# Patient Record
(demographics unavailable — no encounter records)

---

## 2017-01-09 NOTE — PREOP
DATE OF ADMISSION:   01/18/2017

 

DATE OF DICTATION:  11/22/2016

 

HISTORY OF PRESENT ILLNESS:  The patient is a 65-year-old man admitted through the

[facility] for reduction and repair of chronically

incarcerated ventral hernia.  According to the patient, he has had the hernia for

approximately 2 years' time.  Recently it has become slightly larger in size and more

symptomatic.  

 

No underlying GI, , or respiratory complaints to suggest predisposition to hernia

formation. 

 

PAST MEDICAL HISTORY:  Significant only for hypertension.  No history of heart

disease, diabetes, respiratory, renal or hepatic insufficiency.  

 

PAST SURGICAL HISTORY:  Nil. 

 

ALLERGIES: None known. 

 

REGULAR MEDICATIONS:  Amlodipine. 

 

SOCIAL HISTORY:  Negative tobacco, negative alcohol. 

 

FAMILY HISTORY:  Nil. 

 

REVIEW OF SYSTEMS:  Nil. 

 

PHYSICAL EXAMINATION: 

Abdomen:  Soft, protuberant, nontender.  In the supraumbilical midline, there is a

bulge which represents a chronically incarcerated ventral hernia.  The umbilical ring

appears intact. 

 

IMPRESSION:  Chronically incarcerated ventral hernia. 

 

PLAN:  Reduction and repair of chronically incarcerated ventral hernia with mesh. 

 

Indications, terms, possible complications were reviewed.  Consent obtained. 

 

The patient will be seen preoperatively by Dr. Tsering Shine at [address] [place] [address].  Please refer to his notes for those medical details. 

 

 

ASHLEY HODGE/[id number]

DD: 11/22/2016 13:32

DT: 11/22/2016 17:19

Job #:  [id number]

CC:  Tsering Shine MD

## 2017-01-18 NOTE — OP
DATE OF OPERATION:  01/18/2017

 

PREOPERATIVE DIAGNOSIS:  Incarcerated ventral hernia.

 

POSTOPERATIVE DIAGNOSIS:  Incarcerated ventral hernia.

 

PROCEDURE:  Open reduction and repair incarcerated ventral hernia with

mesh/intermediate (8 cm).  

 

OPERATING SURGEON:  Geo Hale M.D.

 

FIRST ASSISTANT:  Bhaskar Loredo DO

 

ANESTHESIA: Paul Mora MD (general)

 

HISTORY:  A 65-year-old man admitted to the hospital for reduction and repair 
of a

chronically incarcerated ventral hernia, which is symptomatic.  

 

INDICATIONS:  Alternatives and possible complications reviewed.  Consent was

obtained.  

 

PROCEDURE:  With the patient in the supine position, and after general 
anesthesia,

the abdomen was prepped and draped in sterile fashion using chlorhexidine. 

 

A 5 cm transverse incision was made directly over the hernia and deepened into 
the

subcutaneous space.  The hernia sac was easily encountered and cleaned to the 
level of

the fascial ring.  

 

The fascial ring was incised circumferentially.  The hernia sac with its 
fibrofatty

contents were reduced.  

 

The retrorectus/preperitoneal space was then developed using sharp dissection,

creating enough room for placement of an underlying mesh.  Ultimately, a Bard

Ventralux hernia patch measuring 8 cm in diameter was selected for the repair.  

 

The patch was placed in the retrorectus/preperitoneal space and pulled up 
against the

undersurface of the anterior abdominal wall musculature using its straps.  The 
mesh

was tacked on with 0 Prolene through and through sutures, fashioning of the 
mesh in

each quadrant to the overlying rectus muscle.  

 

After adequate hemostasis and irrigation, the anterior fascia was closed in a

transverse fashion using interrupted No. 1 Vicryl sutures, leaving the mesh 
entirely

in the retrorectus space.  

 

Subcutaneous tissues were irrigated.  Adequate hemostasis ensured.  The wound 
was

closed in layers.  The subcutaneous tissue was approximated using interrupted 3-
0 Chromic.

4-0 Biosyn sutures interrupted subcuticular sutures placed.  The skin was 
closed using 

4-0 Biosyn subcuticular continuous fashion.  

 

Dermabond applied.  Procedure terminated.  

 

Needle and sponge count correct.  

 

ESTIMATED BLOOD LOSS:  Minimal.  

 

SPECIMEN:  None.

 

DRAIN:  None.  

 

IMPLANT:  Bard Ventralux ST hernia patch (8 cm diameter).  

 

 

ASHLEY HODGE/[id number]

DD: 01/18/2017 11:23

DT: 01/18/2017 14:13

Job #:  [id number]

MTDD

## 2017-01-22 NOTE — PDOC
History of Present Illness





- General


Chief Complaint: Pain, Acute


Stated Complaint: PAIN WITH URINATION POST HERNIA SURGERY


Time Seen by Provider: 01/22/17 23:16





- History of Present Illness


Initial Comments: 





01/23/17 05:39


4 days post op, complains of dysuria. Small urine volumes. No BM x 1 day. Vomit 

x 1. Has not been able to take his anti-htn. On opioids for pain. No fever. 

Mild abd pain. Feet are swollen





pmh: htn


fhx: non-contrib


ros: reviewed and otherwise negative








NAD


no diaphoresis


nonicteric


mmm


nl JVP


rrr


cta


abd distended, tympamic over upper abd, dull over lower abd, well healing 

periumbilical surgical scar, nontender


no CVAT


no groin lesions


no edema


nl speech, nl gait








POCUS--large distended bladder





vázquez placed by me, sterile technique, with 2L urine output





plain films--full of stool, ileus versus partial sbo, as read by me, referred 

to radiology for definitive review





fluid challenge in ED--tolerated PO without difficulty








a/p


post-op urinary retention. vázquez, leg bag, maintain adequate oral hydration





ileus/ constipation--likely secondary to opioids. laxatives, adequate po 

hydration. return to ED for another episode of vomiting








Past History





- Past Medical History


Allergies/Adverse Reactions: 


 Allergies











Allergy/AdvReac Type Severity Reaction Status Date / Time


 


No Known Allergies Allergy   Verified 01/22/17 22:46











Home Medications: 


Ambulatory Orders





Amlodipine Besylate 5 mg PO HS 01/11/17 


Atorvastatin Ca [Lipitor] 20 mg PO HS 01/11/17 


Oxycodone HCl/Acetaminophen [Percocet 5-325 mg Tablet] 1 tab PO Q4H PRN #42 

tablet MDD 6 01/18/17 








Anemia: No


Asthma: No


Cancer: No


Cardiac Disorders: No


CVA: No


COPD: No


CHF: No


Dementia: No


Diabetes: No


GI Disorders: No


 Disorders: No


HTN: Yes


Hypercholesterolemia: Yes


Liver Disease: No


Seizures: No


Thyroid Disease: No





- Surgical History


Abdominal Surgery: No


Appendectomy: No


Cardiac Surgery: No


Cholecystectomy: No


Lung Surgery: No


Neurologic Surgery: No


Orthopedic Surgery: No





- Psycho/Social/Smoking Cessation Hx


Anxiety: No


Suicidal Ideation: No


Smoking History: Never smoked


Have you smoked in the past 12 months: No


Information on smoking cessation initiated: No


Hx Alcohol Use: No


Drug/Substance Use Hx: No


Substance Use Type: None


Hx Substance Use Treatment: No





*Physical Exam





- Vital Signs


 Last Vital Signs











Temp Pulse Resp BP Pulse Ox


 


 98 F   114 H  20   169/107   97 


 


 01/22/17 22:48  01/22/17 22:48  01/22/17 22:48  01/22/17 22:48  01/22/17 22:48














ED Treatment Course





- ADDITIONAL ORDERS


Additional order review: 


 Laboratory  Results











  01/22/17





  22:59


 


Urine Color  Yellow


 


Urine Appearance  Clear


 


Urine pH  5.5


 


Ur Specific Gravity  <= 1.005


 


Urine Protein  Negative


 


Urine Glucose (UA)  Negative


 


Urine Ketones  Negative


 


Urine Blood  2+ H


 


Urine Nitrite  Negative


 


Urine Bilirubin  Negative


 


Urine Urobilinogen  0.2 e.u/dl


 


Ur Leukocyte Esterase  Trace














*DC/Admit/Observation/Transfer


Diagnosis at time of Disposition: 


 Urinary tract obstruction





Constipation


Qualifiers:


 Constipation type: unspecified constipation type Qualified Code(s): K59.00 - 

Constipation, unspecified





- Discharge Dispostion


Disposition: HOME


Condition at time of disposition: Stable





- Patient Instructions


Printed Discharge Instructions:  DI for Constipation, How to Care for Your 

Vázquez Catheter -- Male

## 2017-01-24 NOTE — PDOC
History of Present Illness





- General


Chief Complaint: Urinary Problem


Stated Complaint: URINARY RETENTION


Time Seen by Provider: 01/24/17 22:39


History Source: Patient


Exam Limitations: No Limitations





- History of Present Illness


Initial Comments: 





01/24/17 22:40


65 Y M HTN, s/p hernia repair on 1/18. urinary retention 1/22. vázquez removed 

today at 1 pm. returns c/o pain and inability to void. no fever. no n/v. 





Past History





- Past Medical History


Allergies/Adverse Reactions: 


 Allergies











Allergy/AdvReac Type Severity Reaction Status Date / Time


 


No Known Allergies Allergy   Verified 01/22/17 22:46











Home Medications: 


Ambulatory Orders





Amlodipine Besylate 5 mg PO HS 01/11/17 


Atorvastatin Ca [Lipitor] 20 mg PO HS 01/11/17 


Oxycodone HCl/Acetaminophen [Percocet 5-325 mg Tablet] 1 tab PO Q4H PRN #42 

tablet MDD 6 01/18/17 








Anemia: No


Asthma: No


Cancer: No


Cardiac Disorders: No


CVA: No


COPD: No


CHF: No


Dementia: No


Diabetes: No


GI Disorders: No


 Disorders: No


HTN: Yes


Hypercholesterolemia: Yes


Liver Disease: No


Seizures: No


Thyroid Disease: No





- Surgical History


Abdominal Surgery: No


Appendectomy: No


Cardiac Surgery: No


Cholecystectomy: No


Lung Surgery: No


Neurologic Surgery: No


Orthopedic Surgery: No





- Psycho/Social/Smoking Cessation Hx


Anxiety: No


Suicidal Ideation: No


Smoking History: Never smoked


Have you smoked in the past 12 months: No


Hx Alcohol Use: No


Drug/Substance Use Hx: No


Substance Use Type: None


Hx Substance Use Treatment: No





**Review of Systems





- Review of Systems


Able to Perform ROS?: Yes


Is the patient limited English proficient: No


Constitutional: No: Symptoms Reported


Respiratory: No: Symptoms reported


Cardiac (ROS): No: Symptoms Reported


ABD/GI: No: Symptoms Reported


: Yes: Symptoms Reported, See HPI, Dysuria


Integumentary: No: Symptoms Reported


All Other Systems: Reviewed and Negative





*Physical Exam





- Physical Exam


General Appearance: Yes: Nourished, Appropriately Dressed.  No: Apparent 

Distress


Respiratory/Chest: positive: Lungs Clear


Cardiovascular: positive: Regular Rhythm, Regular Rate


Gastrointestinal/Abdominal: positive: Normal Bowel Sounds.  negative: Tender (

after vázquez placement)


Musculoskeletal: positive: Normal Inspection.  negative: CVA Tenderness


Neurologic: positive: Fully Oriented, Alert, Normal Mood/Affect, Normal Response

, Motor Strength 5/5





Progress Note





- Progress Note


Progress Note: 


recurrent urinary retention (1.5 lt)


keep vázquez


f/u w/ pmd





*DC/Admit/Observation/Transfer


Diagnosis at time of Disposition: 


 Urinary tract obstruction





- Discharge Dispostion


Disposition: HOME


Condition at time of disposition: Improved





- Patient Instructions


Additional Instructions: 


KEEP VÁZQUEZ CATHETER


CALL YOUR DOCTOR TOMORROW


RETURN IF FEVER OR NEW SYMPTOMS

## 2017-01-27 NOTE — PDOC
History of Present Illness





- General


Chief Complaint: Urinary Problem


Stated Complaint: URINARY RETENTION


Time Seen by Provider: 01/27/17 21:53


History Source: Patient


Exam Limitations: No Limitations





- History of Present Illness


Initial Comments: 


01/27/17 22:06


This is a 65-year-old male who is status post bilateral hernia repair 

approximately 9 days ago. Patient has been to this emergency room 3 times 

approximately every other day since the hernia repair for acute urinary 

retention. Between ER visits patient does go see a urologist to removes the 

Henriquez and then patient is back the next day with urinary retention. The 

urologist told the patient to self catheterize until he is no longer 

experiencing urinary retention. Patient did catheterize himself this afternoon 

and there was actually 1500 mL of urine that was removed from the bladder. 

Patient now comes in complaining of suprapubic  pain and discomfort and 

sensation that he needs to urinate and can't urinate. Patient said when he is 

able to urinate he does experience some burning and dysuria with urination. 

Patient denies any hematuria Family is requesting that the Henriquez be put back 

in. They also are requesting referral to a different urologist as they feel 

that the urologist he is seeing is not adequately addressing the problem. 





PAST MEDICAL HISTORY:  no significant history





PAST SURGICAL HISTORY:  no significant history





FAMILY HISTORY:  no pertinant history





SOCIAL HISTORY:  Pt lives with  family and is employed.





MEDICATIONS:  reviewed





ALLERGIES:  As per nursing notes





Review of Systems


General:  No fevers or chills, no weakness, no weight loss 


HEENT: No change in vision.  No sore throat,. No ear pain


CardioVascular:  No chest pain or shortness of breath


Respiratory:No cough, or wheezing. 


Gastrointestinal:  no nausea, vomitting, diarrhea or constipation,  No rectal 

bleeding


Genitourinary urinary retention as per history of present illness


Musculoskeletal:  No joint or muscle pain or swelling


Neurologic: No headache, vertigo, dizziness or loss of consciousness


Psychiatric: nor depression 


Skin: No rashes or easy bruising


Endocrine: no increased thirst or abnormal weight change


Allergic: no skin or latex allergy


All other systems reviewed and normal








GENERAL: The patient is awake, alert, and fully oriented, in no acute distress.


HEAD: Normal with no signs of trauma.


EYES: Pupils equal, round and reactive to light, extraocular movements intact, 

sclera anicteric, conjunctiva clear.


ABDOMEN: There is some mild suprapubic discomfort on palpation, there is no 

guarding or rebound, bowel sounds are normal


BACK: There is no CVA or flank tenderness.


EXTREMITIES: Normal range of motion, no edema.


NEUROLOGICAL: Normal speech, normal gait.


PSYCH: Normal mood, normal affect.


SKIN: Warm, Dry, normal turgor, no rashes or lesions noted.





01/27/17 22:19


Assessment and plan:


This is a 65-year-old male who comes in with acute urinary retention. This is 

his third visit in approximately one week. Patient had Henriquez reinserted with 

approximately 1 L of clear urine. Urinalysis shows no evidence of infection at 

this time. Patient has seen his urologist and was started on Flomax. Patient 

had a basic metabolic profile sent which was normal with the exception of a 

potassium of 3.4. Patient was told to eat a banana a day for the next week and 

was discharged home and given referral to a different urologist as he had 

requested a different referral





Past History





- Past Medical History


Allergies/Adverse Reactions: 


 Allergies











Allergy/AdvReac Type Severity Reaction Status Date / Time


 


No Known Allergies Allergy   Verified 01/24/17 22:42











Home Medications: 


Ambulatory Orders





Amlodipine Besylate 5 mg PO HS 01/11/17 


Atorvastatin Ca [Lipitor] 20 mg PO HS 01/11/17 


Oxycodone HCl/Acetaminophen [Percocet 5-325 mg Tablet] 1 tab PO Q4H PRN #42 

tablet MDD 6 01/18/17 


Tamsulosin HCl [Flomax] 0.4 mg PO HS 01/24/17 








Anemia: No


Asthma: No


Cancer: No


Cardiac Disorders: No


CVA: No


COPD: No


CHF: No


Dementia: No


Diabetes: No


GI Disorders: No


 Disorders: No


HTN: Yes


Hypercholesterolemia: Yes


Liver Disease: No


Seizures: No


Thyroid Disease: No





- Surgical History


Abdominal Surgery: No


Appendectomy: No


Cardiac Surgery: No


Cholecystectomy: No


Lung Surgery: No


Neurologic Surgery: No


Orthopedic Surgery: No





- Psycho/Social/Smoking Cessation Hx


Anxiety: No


Suicidal Ideation: No


Smoking History: Never smoked


Have you smoked in the past 12 months: No


Hx Alcohol Use: No


Drug/Substance Use Hx: No


Substance Use Type: None


Hx Substance Use Treatment: No





*Physical Exam





- Vital Signs


 Last Vital Signs











Temp Pulse Resp BP Pulse Ox


 


 98.0 F   95 H  16   167/94   99 


 


 01/27/17 21:43  01/27/17 21:43  01/27/17 21:43  01/27/17 21:43  01/27/17 21:43














ED Treatment Course





- LABORATORY


CBC & Chemistry Diagram: 


 01/27/17 22:31





*DC/Admit/Observation/Transfer


Diagnosis at time of Disposition: 


 Urinary retention





- Discharge Dispostion


Disposition: HOME


Condition at time of disposition: Good


Admit: No





- Patient Instructions


Additional Instructions: 


Leave the Henriquez in place until you see the urologist.





Your potassium was a little bit low so bananas are high in potassium and eat a 

banana a day for the next week.


You see your doctor have your potassium rechecked





If you want to change a urologist call Dr. Rosales [phone] on Monday 

or tomorrow for an appointment.





Return to the emergency department immediately with ANY new, persistent or 

worsening symptoms.





Continue any medications as previously prescribed by your physician.





You should follow up with your primary doctor as soon as possible regarding 

today's emergency department visit.


.


Please make sure your doctor reviews the results of your emergency evaluation.





Thank you for coming to the   Emergency Department today for your care. It was 

a pleasure to see you today. Please note that your evaluation is INCOMPLETE 

until you  follow-up with your doctor.

## 2017-03-15 NOTE — OP
DATE OF OPERATION:  03/15/2017

 

SURGEON:  Elizabeth Pérez MD

 

ANESTHESIA:  Spinal.

 

PREOPERATIVE DIAGNOSIS:  Urinary retention and benign prostatic hypertrophy.   

 

POSTOPERATIVE DIAGNOSIS:  Urinary retention and benign prostatic hypertrophy.  

 

PROCEDURE:  Plasma button transurethral resection of the prostate. 

 

FINDINGS:  A very large prostate completely obstructive in nature was noted. 

Trilobar hypertrophy with a very large median lobe and subtrigonal lobe noted.  Both

ureteral orifices were found to be pretty close to the bladder neck.  Prostatic

urethra is about 5.5 cm in length.  Veru was noted in the normal position.  

 

DESCRIPTION OF PROCEDURE:  Patient in lithotomy position under spinal anesthesia was

prepped and draped in the usual manner.  Using 26 Huffman resectoscope and ACMI

plasma button, the median bore and subtrigonal lobe were lasered first using the

plasma button.  Vaporization of the middle lobe carried out, then, the lateral lobes.

 At the end of the procedure, a good channel was noted.  Severe bleeding noted

throughout the procedure, and most of the bleeding was controlled using the

coagulation.  At the end of the procedure, the prostate lateral lobes were found to

be extending beyond the veru, and they were left alone for fear of incontinence.  At

the end of the procedure, a good channel was noted, and the bladder was filled and

the scope was removed.  A good flow of urine noted.  A 24 Henriquez was left indwelling. 

Continuous irrigation started.  Patient tolerated the procedure well, left the

operating room in satisfactory condition.  

 

 

ASHLEY [id number]

DD: 03/15/2017 15:38

DT: 03/15/2017 16:29

Job #:  [id number]

## 2017-03-15 NOTE — OP
Operative Note





- Note:


Operative Date: 03/15/17


Pre-Operative Diagnosis: Urinary retention and BPH


Operation: TURP


Findings: 


Large OBstructive prostate


Post-Operative Diagnosis: Same as Pre-op


Surgeon: Elizabeth Pérez


Anesthesia: Spinal


Specimens Removed: Prostate


Estimated Blood Loss (mls): 50


Drains & Tubes with Location: 24 F [address] way vázquez

## 2017-03-16 NOTE — PN
Progress Note (short form)





- Note


Progress Note: 


vss


CTA


S1-S2


ABDOMEN SOFT, NT BS POS


LABS REVIWED


A/P


urolog stable for d/c


to f/u in office at 9am for d/c gurpreet

## 2017-03-16 NOTE — PN
Progress Note (short form)





- Note


Progress Note: 


Doing well. Urine clear, abd soft. For discharge today. Progress satisfactory.

## 2017-03-17 NOTE — PATH
Surgical Pathology Report



Patient Name:  ARIANNE PEREZ

Accession #:  [id number]

Med. Rec. #:  [id number]                                                        

   /Age/Gender:  1951 (Age: 66) / M

Account:  [id number]                                                          

             Location: AMBULATORY SURG

Taken:  3/16/2017

Received:  3/16/2017

Reported:  3/17/2017

Physicians:  Elizabeth Pérez M.D.

  



Specimen(s) Received

 PROSTATE CHIPS 





Clinical History

Hypertrophy of prostate







Final Diagnosis

PROSTATE, TUR:

BENIGN PROSTATIC HYPERPLASIA WITH FOCAL ACUTE INFLAMMATION AND ATROPHY.





***Electronically Signed***

Garry Holder M.D.





Gross Description

Received in formalin labelled "prostate chips" is a 4 gram, 3 x 2 x 0.5 cm

aggregate of rubbery gray tissue fragments and clotted blood. Totally submitted

in 4 cassettes.

[facility]/3/16/2017



[facility]/3/16/2017